# Patient Record
Sex: FEMALE | Race: WHITE
[De-identification: names, ages, dates, MRNs, and addresses within clinical notes are randomized per-mention and may not be internally consistent; named-entity substitution may affect disease eponyms.]

---

## 2024-08-05 ENCOUNTER — P2P PATIENT RECORD (OUTPATIENT)
Age: 54
End: 2024-08-05

## 2024-08-08 ENCOUNTER — OFFICE VISIT (OUTPATIENT)
Dept: URBAN - NONMETROPOLITAN AREA CLINIC 4 | Facility: CLINIC | Age: 54
End: 2024-08-08

## 2024-08-08 ENCOUNTER — OFFICE VISIT (OUTPATIENT)
Dept: URBAN - NONMETROPOLITAN AREA CLINIC 13 | Facility: CLINIC | Age: 54
End: 2024-08-08

## 2024-08-14 ENCOUNTER — LAB OUTSIDE AN ENCOUNTER (OUTPATIENT)
Dept: URBAN - NONMETROPOLITAN AREA CLINIC 13 | Facility: CLINIC | Age: 54
End: 2024-08-14

## 2024-08-14 ENCOUNTER — DASHBOARD ENCOUNTERS (OUTPATIENT)
Age: 54
End: 2024-08-14

## 2024-08-14 ENCOUNTER — OFFICE VISIT (OUTPATIENT)
Dept: URBAN - NONMETROPOLITAN AREA CLINIC 13 | Facility: CLINIC | Age: 54
End: 2024-08-14
Payer: COMMERCIAL

## 2024-08-14 ENCOUNTER — LAB OUTSIDE AN ENCOUNTER (OUTPATIENT)
Dept: URBAN - NONMETROPOLITAN AREA CLINIC 2 | Facility: CLINIC | Age: 54
End: 2024-08-14

## 2024-08-14 VITALS
WEIGHT: 192.2 LBS | DIASTOLIC BLOOD PRESSURE: 80 MMHG | BODY MASS INDEX: 32.81 KG/M2 | HEART RATE: 64 BPM | HEIGHT: 64 IN | SYSTOLIC BLOOD PRESSURE: 118 MMHG

## 2024-08-14 DIAGNOSIS — R74.8 ELEVATED ALKALINE PHOSPHATASE LEVEL: ICD-10-CM

## 2024-08-14 DIAGNOSIS — Z12.11 COLON CANCER SCREENING: ICD-10-CM

## 2024-08-14 DIAGNOSIS — Z83.79 FAMILY HISTORY OF BILIARY DISEASE: ICD-10-CM

## 2024-08-14 PROBLEM — 422587007: Status: ACTIVE | Noted: 2024-08-14

## 2024-08-14 PROBLEM — 305058001: Status: ACTIVE | Noted: 2024-08-14

## 2024-08-14 PROBLEM — 16331000: Status: ACTIVE | Noted: 2024-08-14

## 2024-08-14 PROBLEM — 266903003: Status: ACTIVE | Noted: 2024-08-14

## 2024-08-14 PROBLEM — 274770006: Status: ACTIVE | Noted: 2024-08-14

## 2024-08-14 LAB
A/G RATIO: 1.1
ALBUMIN: 3.5
ALKALINE PHOSPHATASE: 135
ALT (SGPT): 65
ANION GAP: 13
AST (SGOT): 55
BASOPHILS AUTOMATED ABSOLUTE COUNT: 0
BASOPHILS RELATIVE PERCENT: 0.5
BILIRUBIN TOTAL: 0.5
BLOOD UREA NITROGEN: 15
BUN / CREAT RATIO: 16
CALCIUM: 8.5
CHLORIDE: 107
CO2: 26
CREATININE, SERUM: 0.92
EGFR (CKD-EPI): >60
EOSINOPHILS AUTOMATED ABSOLUTE COUNT: 0.1
EOSINOPHILS RELATIVE PERCENT: 0.6
FERRITIN: 66.9
GAMMA GLUTAMYL TRANSFERASE: 127
GLUCOSE: 89
HEMATOCRIT: 39.8
HEMOGLOBIN: 12.9
INR: 0.9
IRON SATURATION: 37
IRON: 105
LYMPHOCYTES AUTOMATED ABSOLUTE COUNT: 2.7
LYMPHOCYTES RELATIVE PERCENT: 26.4
MEAN CORPUSCULAR HEMOGLOBIN CONC: 32.3
MEAN CORPUSCULAR HEMOGLOBIN: 31.1
MEAN CORPUSCULAR VOLUME: 96.4
MEAN PLATELET VOLUME: 10.6
MONOCYTES AUTOMATED ABSOLUTE COUNT: 0.7
MONOCYTES RELATIVE PERCENT: 7
NEUTROPHILS AUTOMATED ABSOLUTE: 6.6
NEUTROPHILS RELATIVE PERCENT: 65.5
PLATELET COUNT: 256
POTASSIUM: 4.2
PROTEIN TOTAL: 6.7
PROTHROMBIN TIME: 10.5
RED BLOOD CELL COUNT: 4.13
RED CELL DISTRIBUTION WIDTH: 13.4
SODIUM: 142
TOTAL IRON BINDING CAPACITY: 286
TSH: 1.71
UNSATURATED IRON BINDING CAPACITY: 181
WHITE BLOOD CELL COUNT: 10.1

## 2024-08-14 PROCEDURE — 99204 OFFICE O/P NEW MOD 45 MIN: CPT | Performed by: NURSE PRACTITIONER

## 2024-08-14 PROCEDURE — 99244 OFF/OP CNSLTJ NEW/EST MOD 40: CPT | Performed by: NURSE PRACTITIONER

## 2024-08-14 NOTE — HPI-TODAY'S VISIT:
8/14/2024 The patient is a 54-year-old female referred to me by her primary care physician.  A copy of this note be sent to the referring physician.  Today she presents with multiple GI complaints.  She recently developed hydronephrosis with a kidney stone requiring surgical removal by Dr. Watkins.  During this acute process her alkaline phosphatase increased from the 120s to the 200s.  Her GGT is elevated.  She does have 1 to 2 glasses of wine 3 to 4 days a week.  She denies any over-the-counter supplements.  She takes NSAIDs occasionally.  Her CT with contrast shows normal liver and bile ducts.  Her sister had a choledochal cyst that required surgical revision.  Her AMA and her hepatitis panel are negative per her primary care physician.  She states in reviewing her labs with her PCP this has been mildly elevated since last year.  She does struggle with her weight and has recently been on semaglutide down 20 pounds but back up 12 pounds after being off for 3 weeks during this acute kidney stone process.  She has never had an EGD or colonoscopy in the past.  She does have some low-grade nausea in the morning and heartburn occasionally.  She uses Pepcid as needed.  Today her main concern are her liver enzymes.  She agrees to pursue chronic serologies.  She is very anxious given her sisters history so we will schedule MRI for further evaluation.  She is also been in [place] this summer and developed diarrhea with rash.  The diarrhea has abated but we will check her stool studies for parasites.  Her alkaline phosphatase is isolated elevated with normal total bilirubin and normal synthetic function.  I suspect she has a component of NAFLD along with acute elevation secondary to her kidney stone with obstructive uropathy.  MB

## 2024-08-15 ENCOUNTER — TELEPHONE ENCOUNTER (OUTPATIENT)
Dept: URBAN - NONMETROPOLITAN AREA CLINIC 2 | Facility: CLINIC | Age: 54
End: 2024-08-15

## 2024-08-16 ENCOUNTER — LAB OUTSIDE AN ENCOUNTER (OUTPATIENT)
Dept: URBAN - NONMETROPOLITAN AREA CLINIC 13 | Facility: CLINIC | Age: 54
End: 2024-08-16

## 2024-08-16 LAB
ALKALINE PHOSPHATASE BONE FRACTION: 28
ALKALINE PHOSPHATASE INTESTINE FRACTION: 0
ALKALINE PHOSPHATASE LIVER FRACTION: 48
ALKALINE PHOSPHATASE: 145
INTERPRETATION ALK PHOS: (no result)
MACROHEPATIC ISOENZYMES: 25
MITOCHONDRIA M2 ANTIBODY (IGG): <=20
PLACENTAL ISO: 0

## 2024-08-17 ENCOUNTER — LAB OUTSIDE AN ENCOUNTER (OUTPATIENT)
Dept: URBAN - NONMETROPOLITAN AREA CLINIC 13 | Facility: CLINIC | Age: 54
End: 2024-08-17

## 2024-08-17 LAB — LIVER KIDNEY MICROSOME ABS: <=20

## 2024-08-18 ENCOUNTER — WEB ENCOUNTER (OUTPATIENT)
Dept: URBAN - NONMETROPOLITAN AREA CLINIC 2 | Facility: CLINIC | Age: 54
End: 2024-08-18

## 2024-08-18 LAB
ALPHA-1 ANTITRYPSIN: 136
SMOOTH MUSCLE ANTIBODY: <20

## 2024-08-23 LAB — ANA SCREEN IFA: NEGATIVE

## 2024-08-24 ENCOUNTER — WEB ENCOUNTER (OUTPATIENT)
Dept: URBAN - NONMETROPOLITAN AREA CLINIC 2 | Facility: CLINIC | Age: 54
End: 2024-08-24

## 2024-08-24 LAB
GAMMAGLOBULIN; IGA: 663
INTERP CELIAC DISEASE: (no result)
TTG AB IGA: <1

## 2024-08-26 LAB — OVA + PARASITE EXAM: (no result)

## 2024-08-29 ENCOUNTER — WEB ENCOUNTER (OUTPATIENT)
Dept: URBAN - NONMETROPOLITAN AREA CLINIC 2 | Facility: CLINIC | Age: 54
End: 2024-08-29

## 2024-08-30 ENCOUNTER — OFFICE VISIT (OUTPATIENT)
Dept: URBAN - NONMETROPOLITAN AREA CLINIC 2 | Facility: CLINIC | Age: 54
End: 2024-08-30

## 2024-09-12 ENCOUNTER — TELEPHONE ENCOUNTER (OUTPATIENT)
Dept: URBAN - NONMETROPOLITAN AREA CLINIC 2 | Facility: CLINIC | Age: 54
End: 2024-09-12

## 2024-09-24 ENCOUNTER — LAB OUTSIDE AN ENCOUNTER (OUTPATIENT)
Dept: URBAN - NONMETROPOLITAN AREA CLINIC 2 | Facility: CLINIC | Age: 54
End: 2024-09-24

## 2024-09-24 PROBLEM — 70342003: Status: ACTIVE | Noted: 2024-09-24

## 2024-10-02 ENCOUNTER — OFFICE VISIT (OUTPATIENT)
Dept: URBAN - NONMETROPOLITAN AREA SURGERY CENTER 1 | Facility: SURGERY CENTER | Age: 54
End: 2024-10-02

## 2024-10-17 ENCOUNTER — TELEPHONE ENCOUNTER (OUTPATIENT)
Dept: URBAN - NONMETROPOLITAN AREA CLINIC 2 | Facility: CLINIC | Age: 54
End: 2024-10-17

## 2024-10-19 ENCOUNTER — WEB ENCOUNTER (OUTPATIENT)
Dept: URBAN - NONMETROPOLITAN AREA CLINIC 13 | Facility: CLINIC | Age: 54
End: 2024-10-19

## 2024-11-11 ENCOUNTER — OFFICE VISIT (OUTPATIENT)
Dept: URBAN - NONMETROPOLITAN AREA SURGERY CENTER 1 | Facility: SURGERY CENTER | Age: 54
End: 2024-11-11

## 2024-11-12 ENCOUNTER — OFFICE VISIT (OUTPATIENT)
Dept: URBAN - NONMETROPOLITAN AREA CLINIC 2 | Facility: CLINIC | Age: 54
End: 2024-11-12
Payer: COMMERCIAL

## 2024-11-12 VITALS
DIASTOLIC BLOOD PRESSURE: 85 MMHG | BODY MASS INDEX: 29.88 KG/M2 | HEIGHT: 64 IN | WEIGHT: 175 LBS | SYSTOLIC BLOOD PRESSURE: 119 MMHG | HEART RATE: 58 BPM

## 2024-11-12 DIAGNOSIS — R74.8 ELEVATED ALKALINE PHOSPHATASE LEVEL: ICD-10-CM

## 2024-11-12 DIAGNOSIS — Z12.11 COLON CANCER SCREENING: ICD-10-CM

## 2024-11-12 DIAGNOSIS — K80.20 CALCULUS OF GALLBLADDER WITHOUT CHOLECYSTITIS WITHOUT OBSTRUCTION: ICD-10-CM

## 2024-11-12 DIAGNOSIS — Z83.79 FAMILY HISTORY OF BILIARY DISEASE: ICD-10-CM

## 2024-11-12 DIAGNOSIS — R12 HEARTBURN: ICD-10-CM

## 2024-11-12 DIAGNOSIS — R11.0 NAUSEA: ICD-10-CM

## 2024-11-12 PROCEDURE — 99214 OFFICE O/P EST MOD 30 MIN: CPT | Performed by: NURSE PRACTITIONER

## 2024-11-12 NOTE — HPI-TODAY'S VISIT:
8/14/2024 The patient is a 54-year-old female referred to me by her primary care physician.  A copy of this note be sent to the referring physician.  Today she presents with multiple GI complaints.  She recently developed hydronephrosis with a kidney stone requiring surgical removal by Dr. Watkins.  During this acute process her alkaline phosphatase increased from the 120s to the 200s.  Her GGT is elevated.  She does have 1 to 2 glasses of wine 3 to 4 days a week.  She denies any over-the-counter supplements.  She takes NSAIDs occasionally.  Her CT with contrast shows normal liver and bile ducts.  Her sister had a choledochal cyst that required surgical revision.  Her AMA and her hepatitis panel are negative per her primary care physician.  She states in reviewing her labs with her PCP this has been mildly elevated since last year.  She does struggle with her weight and has recently been on semaglutide down 20 pounds but back up 12 pounds after being off for 3 weeks during this acute kidney stone process.  She has never had an EGD or colonoscopy in the past.  She does have some low-grade nausea in the morning and heartburn occasionally.  She uses Pepcid as needed.  Today her main concern are her liver enzymes.  She agrees to pursue chronic serologies.  She is very anxious given her sisters history so we will schedule MRI for further evaluation.  She is also been in [place] this summer and developed diarrhea with rash.  The diarrhea has abated but we will check her stool studies for parasites.  Her alkaline phosphatase is isolated elevated with normal total bilirubin and normal synthetic function.  I suspect she has a component of NAFLD along with acute elevation secondary to her kidney stone with obstructive uropathy.  MB 11/12/2024 Sarah presents for follow-up.  Since her last visit her alkaline phosphatase trended down but her AST ALT increased.  Her MRI shows a large gallstone.  Her FibroScan shows F1 fibrosis but no advanced disease.  Her serologies are negative, her total IgA is actually elevated, her PCP was concerned that she had celiac disease and recommended a gluten-free diet per her report, we will obtain those records.  This is not an indication of celiac disease.  She does feel amazing after stopping gluten.  She is also stopped drinking alcohol and has lost a significant amount of weight on Mounjaro.  Today she has no further nausea or reflux.  Her GI complaints have improved.  We will recheck her labs.  Will refer to Dr. Rahman given her large gallstone to establish care.  Otherwise she is doing well today.  She is scheduled for her screening colonoscopy in January.  MB

## 2024-11-15 LAB
A/G RATIO: 1.5
ABSOLUTE BASOPHILS: 52
ABSOLUTE EOSINOPHILS: 150
ABSOLUTE LYMPHOCYTES: 2074
ABSOLUTE MONOCYTES: 546
ABSOLUTE NEUTROPHILS: 3679
ALBUMIN: 4.2
ALKALINE PHOSPHATASE: 95
ALKALINE PHOSPHATASE: 98
ALT (SGPT): 11
AST (SGOT): 17
BASOPHILS: 0.8
BILIRUBIN, TOTAL: 0.6
BONE ISOENZYMES: 38
BUN/CREATININE RATIO: (no result)
BUN: 15
CALCIUM: 9.2
CARBON DIOXIDE, TOTAL: 25
CHLORIDE: 103
CREATININE: 0.8
EGFR: 86
EOSINOPHILS: 2.3
GGT: 35
GLOBULIN, TOTAL: 2.8
GLUCOSE: 86
HEMATOCRIT: 45.7
HEMOGLOBIN: 14.7
INTESTINAL ISOENZYMES: 0
LIVER ISOENZYMES: 62
LYMPHOCYTES: 31.9
MACROHEPATIC ISOENZYMES: 0
MCH: 31.3
MCHC: 32.2
MCV: 97.4
MONOCYTES: 8.4
MPV: 12.7
NEUTROPHILS: 56.6
PLACENTAL ISOENZYMES: 0
PLATELET COUNT: 225
POTASSIUM: 4.6
PROTEIN, TOTAL: 7
RDW: 13.2
RED BLOOD CELL COUNT: 4.69
SODIUM: 138
WHITE BLOOD CELL COUNT: 6.5

## 2024-11-18 ENCOUNTER — TELEPHONE ENCOUNTER (OUTPATIENT)
Dept: URBAN - NONMETROPOLITAN AREA CLINIC 2 | Facility: CLINIC | Age: 54
End: 2024-11-18

## 2024-11-23 ENCOUNTER — WEB ENCOUNTER (OUTPATIENT)
Dept: URBAN - NONMETROPOLITAN AREA CLINIC 2 | Facility: CLINIC | Age: 54
End: 2024-11-23

## 2024-11-24 ENCOUNTER — LAB OUTSIDE AN ENCOUNTER (OUTPATIENT)
Dept: URBAN - NONMETROPOLITAN AREA CLINIC 2 | Facility: CLINIC | Age: 54
End: 2024-11-24

## 2025-01-06 ENCOUNTER — OFFICE VISIT (OUTPATIENT)
Dept: URBAN - NONMETROPOLITAN AREA SURGERY CENTER 1 | Facility: SURGERY CENTER | Age: 55
End: 2025-01-06

## 2025-05-13 ENCOUNTER — OFFICE VISIT (OUTPATIENT)
Dept: URBAN - NONMETROPOLITAN AREA CLINIC 2 | Facility: CLINIC | Age: 55
End: 2025-05-13